# Patient Record
Sex: FEMALE | Race: WHITE | NOT HISPANIC OR LATINO
[De-identification: names, ages, dates, MRNs, and addresses within clinical notes are randomized per-mention and may not be internally consistent; named-entity substitution may affect disease eponyms.]

---

## 2019-06-14 PROBLEM — Z00.00 ENCOUNTER FOR PREVENTIVE HEALTH EXAMINATION: Status: ACTIVE | Noted: 2019-06-14

## 2019-06-19 ENCOUNTER — APPOINTMENT (OUTPATIENT)
Dept: PLASTIC SURGERY | Facility: CLINIC | Age: 59
End: 2019-06-19
Payer: COMMERCIAL

## 2019-06-19 VITALS
OXYGEN SATURATION: 99 % | DIASTOLIC BLOOD PRESSURE: 95 MMHG | SYSTOLIC BLOOD PRESSURE: 134 MMHG | BODY MASS INDEX: 31.71 KG/M2 | TEMPERATURE: 97.7 F | WEIGHT: 179 LBS | HEART RATE: 73 BPM | HEIGHT: 63 IN | RESPIRATION RATE: 20 BRPM

## 2019-06-19 DIAGNOSIS — S82.209B UNSPECIFIED FRACTURE OF SHAFT OF UNSPECIFIED TIBIA, INITIAL ENCOUNTER FOR OPEN FRACTURE TYPE I OR II: ICD-10-CM

## 2019-06-19 DIAGNOSIS — Z87.891 PERSONAL HISTORY OF NICOTINE DEPENDENCE: ICD-10-CM

## 2019-06-19 DIAGNOSIS — S82.409B UNSPECIFIED FRACTURE OF SHAFT OF UNSPECIFIED TIBIA, INITIAL ENCOUNTER FOR OPEN FRACTURE TYPE I OR II: ICD-10-CM

## 2019-06-19 DIAGNOSIS — Z86.79 PERSONAL HISTORY OF OTHER DISEASES OF THE CIRCULATORY SYSTEM: ICD-10-CM

## 2019-06-19 PROCEDURE — 99203 OFFICE O/P NEW LOW 30 MIN: CPT

## 2019-06-19 RX ORDER — LISINOPRIL 10 MG/1
10 TABLET ORAL
Refills: 0 | Status: ACTIVE | COMMUNITY

## 2019-06-19 RX ORDER — CALCIUM CITRATE/VITAMIN D3 315MG-6.25
315-6.25 TABLET ORAL
Refills: 0 | Status: ACTIVE | COMMUNITY

## 2019-06-19 RX ORDER — IBUPROFEN 200 MG/1
200 TABLET, COATED ORAL
Refills: 0 | Status: ACTIVE | COMMUNITY

## 2019-06-19 RX ORDER — ACETAMINOPHEN/DIPHENHYDRAMINE 500MG-25MG
1000 TABLET ORAL
Refills: 0 | Status: ACTIVE | COMMUNITY

## 2019-06-19 NOTE — ASSESSMENT
[FreeTextEntry1] : A:\par Probable hematoma right breast by report\par P:\par Will evaluate with ultrasound\par
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
## 2019-06-19 NOTE — REVIEW OF SYSTEMS
[Feeling Poorly] : feeling poorly [Cough] : cough [Lower Ext Edema] : lower extremity edema [Feeling Tired] : feeling tired [Difficulty Walking] : difficulty walking [Breast Lump] : breast lump [Negative] : Heme/Lymph

## 2019-06-19 NOTE — PHYSICAL EXAM
[NI] : Normal [de-identified] : 5 cm firm mass left breast above and under the areola \par No palpable adenopathy

## 2019-06-19 NOTE — REASON FOR VISIT
[Initial Evaluation] : an initial evaluation [FreeTextEntry1] : Patient presents with a hematoma in the right breast

## 2019-09-18 ENCOUNTER — APPOINTMENT (OUTPATIENT)
Dept: PLASTIC SURGERY | Facility: CLINIC | Age: 59
End: 2019-09-18
Payer: COMMERCIAL

## 2019-09-18 DIAGNOSIS — N64.89 OTHER SPECIFIED DISORDERS OF BREAST: ICD-10-CM

## 2019-09-18 PROCEDURE — 99213 OFFICE O/P EST LOW 20 MIN: CPT

## 2019-09-18 NOTE — PHYSICAL EXAM
[NI] : Normal [de-identified] : nodule right supra areolar region\par no surrounding erythema or purulence

## 2019-09-18 NOTE — REASON FOR VISIT
[Follow-Up: _____] : a [unfilled] follow-up visit [FreeTextEntry1] : Patient returns following radiologic work up of the right breast nodule, most consistent with fat necrosis (Mammo, US, and MRI)

## 2019-09-18 NOTE — ASSESSMENT
[FreeTextEntry1] : A;\par Fat necrosis right breast\par no longer painful\par P;\par Discussed at length with the patient and we agreed to follow clinically with appropriate radiologic follow up as well

## 2020-06-05 ENCOUNTER — RESULT REVIEW (OUTPATIENT)
Age: 60
End: 2020-06-05

## 2020-06-29 ENCOUNTER — RESULT REVIEW (OUTPATIENT)
Age: 60
End: 2020-06-29

## 2021-08-25 ENCOUNTER — RESULT REVIEW (OUTPATIENT)
Age: 61
End: 2021-08-25

## 2024-02-12 ENCOUNTER — APPOINTMENT (OUTPATIENT)
Dept: BARIATRICS/WEIGHT MGMT | Facility: CLINIC | Age: 64
End: 2024-02-12

## 2025-07-08 ENCOUNTER — APPOINTMENT (OUTPATIENT)
Facility: CLINIC | Age: 65
End: 2025-07-08

## 2025-07-14 ENCOUNTER — APPOINTMENT (OUTPATIENT)
Facility: CLINIC | Age: 65
End: 2025-07-14
Payer: COMMERCIAL

## 2025-07-14 VITALS
DIASTOLIC BLOOD PRESSURE: 90 MMHG | HEIGHT: 63 IN | BODY MASS INDEX: 35.03 KG/M2 | WEIGHT: 197.7 LBS | HEART RATE: 70 BPM | SYSTOLIC BLOOD PRESSURE: 160 MMHG

## 2025-07-14 VITALS — DIASTOLIC BLOOD PRESSURE: 84 MMHG | SYSTOLIC BLOOD PRESSURE: 138 MMHG

## 2025-07-14 PROCEDURE — 99205 OFFICE O/P NEW HI 60 MIN: CPT

## 2025-07-14 RX ORDER — CARVEDILOL 6.25 MG/1
6.25 TABLET, FILM COATED ORAL TWICE DAILY
Qty: 60 | Refills: 0 | Status: ACTIVE | COMMUNITY
Start: 2025-07-14

## 2025-08-11 ENCOUNTER — APPOINTMENT (OUTPATIENT)
Facility: CLINIC | Age: 65
End: 2025-08-11
Payer: COMMERCIAL

## 2025-08-11 VITALS
SYSTOLIC BLOOD PRESSURE: 130 MMHG | WEIGHT: 194.8 LBS | HEART RATE: 78 BPM | HEIGHT: 63 IN | DIASTOLIC BLOOD PRESSURE: 80 MMHG | BODY MASS INDEX: 34.52 KG/M2

## 2025-08-11 DIAGNOSIS — F41.9 ANXIETY DISORDER, UNSPECIFIED: ICD-10-CM

## 2025-08-11 DIAGNOSIS — I10 ESSENTIAL (PRIMARY) HYPERTENSION: ICD-10-CM

## 2025-08-11 PROCEDURE — 99214 OFFICE O/P EST MOD 30 MIN: CPT

## 2025-08-11 RX ORDER — SEMAGLUTIDE 0.25 MG/.5ML
0.25 INJECTION, SOLUTION SUBCUTANEOUS
Qty: 1 | Refills: 0 | Status: ACTIVE | COMMUNITY
Start: 2025-08-11 | End: 1900-01-01

## 2025-08-11 RX ORDER — ONDANSETRON 4 MG/1
4 TABLET, ORALLY DISINTEGRATING ORAL
Qty: 30 | Refills: 0 | Status: ACTIVE | COMMUNITY
Start: 2025-08-11 | End: 1900-01-01

## 2025-09-09 ENCOUNTER — RX RENEWAL (OUTPATIENT)
Age: 65
End: 2025-09-09